# Patient Record
Sex: FEMALE | Race: BLACK OR AFRICAN AMERICAN | NOT HISPANIC OR LATINO | URBAN - METROPOLITAN AREA
[De-identification: names, ages, dates, MRNs, and addresses within clinical notes are randomized per-mention and may not be internally consistent; named-entity substitution may affect disease eponyms.]

---

## 2017-11-02 ENCOUNTER — EMERGENCY (EMERGENCY)
Facility: HOSPITAL | Age: 57
LOS: 1 days | Discharge: HOME | End: 2017-11-02
Attending: EMERGENCY MEDICINE | Admitting: EMERGENCY MEDICINE
Payer: MEDICAID

## 2017-11-02 VITALS — TEMPERATURE: 98 F | HEART RATE: 71 BPM | OXYGEN SATURATION: 100 % | RESPIRATION RATE: 16 BRPM

## 2017-11-02 PROCEDURE — 99283 EMERGENCY DEPT VISIT LOW MDM: CPT | Mod: 25

## 2017-11-02 PROCEDURE — 12001 RPR S/N/AX/GEN/TRNK 2.5CM/<: CPT | Mod: F9

## 2017-11-02 NOTE — ED PROCEDURE NOTE - CPROC ED POST PROC CARE GUIDE1
Verbal/written post procedure instructions were given to patient/caregiver./Return to ED in 2 days. Patient understands/Instructed patient/caregiver to follow-up with primary care physician.

## 2017-11-02 NOTE — ED PROVIDER NOTE - PHYSICAL EXAMINATION
Gen: No acute distress, alert, cooperative  MSK: Normal ROM of hand and fingers. Can flex distal phalange all fingers. pulses present, wound still bleeding without pressure.   Neuro: sensation intact fingers  Skin: Laceration to rt 5th digit. A chunk of skin is almost unattached at tip of 5th rt digit. bleeding from wound when pressure released, no tendons or nerves seen with exploration of wound. No foreign bodies.  Psych: normal affect, follows commands Gen: No acute distress, alert, cooperative  MSK: Normal ROM of hand and fingers. Can flex distal phalange all fingers. pulses present, wound still bleeding without pressure.   Neuro: sensation intact fingers  Skin: Laceration to rt 5th digit. <1cm square segment of skin is almost unattached at tip of 5th rt digit but is pliable and viable. bleeding from wound when pressure released, no bone seen with exploration of wound. No foreign bodies.  Psych: normal affect, follows commands

## 2017-11-02 NOTE — ED PROVIDER NOTE - PROGRESS NOTE DETAILS
Fransisco - See proc note for details. Pt to return in 2 days for wound check. Patient understands and agreable with plan. Discharging.

## 2017-11-02 NOTE — ED PROCEDURE NOTE - PROCEDURE ADDITIONAL DETAILS
Skin flap on 5th digit completely off. Discussed options with pt. Patient prefers option of suturing flap back on with understanding that it might not take and end up falling off. Needs f/u. Ring block done, completely numb. 5 sutures placed along with bulky dressing and basic splint.

## 2017-11-02 NOTE — ED PROVIDER NOTE - ATTENDING CONTRIBUTION TO CARE
I, Jennifer Cabot, MD, have performed a history and physical exam of the patient and discussed their management with the resident. I reviewed the resident's note and agree with the documented findings and plan of care. My medical decision making and observations are found above.    Cabot: 57F with PMH of DM2 s/p laceration of 5th digit with kitchen knife today.  Tetanus UTD.  On exam, tip with complete avulsion, no bone showing, no sign of infection or FB.  Irrigated copiously and tip sutured.  Will have patient return in 2 days for wound check. I, Jennifer Cabot, MD, have performed a history and physical exam of the patient and discussed their management with the resident. I reviewed the resident's note and agree with the documented findings and plan of care. My medical decision making and observations are found above.    Cabot: 57F with PMH of DM2 s/p laceration of 5th digit with kitchen knife today.  Skin flap irrigated copiously and reapplied by patient at site within minutes.  Tetanus UTD.  On exam, <1cm area of tip (1.5mm thick) with complete avulsion, no bone exposed, skin flap pliable and viable; no sign of infection or FB.  Irrigated copiously and tip sutured.  Bulky dressing and splint applied.  Will have patient return in 2 days for wound check.

## 2017-11-02 NOTE — ED PROVIDER NOTE - OBJECTIVE STATEMENT
56yo female with hx of DM2 not on insulin presenting after a laceration to right 5th digit with knife while cooking. Seen by PMD who bandaged and sent to ER for stitches. 58yo female with hx of DM2 not on insulin presenting after a laceration to right 5th digit with knife while cooking. Seen by PMD who bandaged and sent to ER for stitches. Tetanus up-to-date.

## 2017-11-02 NOTE — ED PROVIDER NOTE - MEDICAL DECISION MAKING DETAILS
58yo female with hx of DM2 not on insulin presenting after a laceration to right 5th digit with knife while cooking. Seen by PMD who bandaged and sent to ER for stitches. Likely stitches and discharge. 56yo female with hx of DM2 not on insulin presenting after a laceration to right 5th digit with knife while cooking. Seen by PMD who bandaged and sent to ER for stitches. Likely stitches and discharge. No tetanus needed. 58yo female with hx of DM2 not on insulin presenting after a laceration to right 5th digit with knife while cooking. Seen by PMD who bandaged and sent to ER for stitches. Likely stitches and discharge. No tetanus needed.    Cabot: 57F with PMH of DM2 s/p laceration of 5th digit with kitchen knife today.  Tetanus UTD.  On exam, tip with complete avulsion, no bone showing, no sign of infection or FB.  Irrigated copiously and tip sutured.  Will have patient return in 2 days for wound check. 58yo female with hx of DM2 not on insulin presenting after a laceration to right 5th digit with knife while cooking. Seen by PMD who bandaged and sent to ER for stitches. Likely stitches and discharge. Tetanus is UTD.    Cabot: 57F with PMH of DM2 s/p laceration of 5th digit with kitchen knife today.  Tetanus UTD.  On exam, <1cm area of tip (1.5mm thick) with complete avulsion, no bone showing, skin flap pliable and viable; no sign of infection or FB.  Irrigated copiously and tip sutured.  Bulky dressing and splint applied.  Will have patient return in 2 days for wound check.  Tetanus is UTD.

## 2017-11-02 NOTE — ED ADULT TRIAGE NOTE - CHIEF COMPLAINT QUOTE
Sent in by PMD for suture of laceration of right 5th digit which occurred while cutting up food. PMD dressed wound.

## 2017-11-04 ENCOUNTER — EMERGENCY (EMERGENCY)
Facility: HOSPITAL | Age: 57
LOS: 1 days | Discharge: HOME | End: 2017-11-04
Attending: EMERGENCY MEDICINE | Admitting: EMERGENCY MEDICINE
Payer: MEDICAID

## 2017-11-04 VITALS
HEART RATE: 69 BPM | OXYGEN SATURATION: 100 % | SYSTOLIC BLOOD PRESSURE: 136 MMHG | RESPIRATION RATE: 18 BRPM | TEMPERATURE: 98 F | DIASTOLIC BLOOD PRESSURE: 55 MMHG

## 2017-11-04 PROCEDURE — 99282 EMERGENCY DEPT VISIT SF MDM: CPT

## 2017-11-04 NOTE — ED PROVIDER NOTE - MEDICAL DECISION MAKING DETAILS
Plan is to redress wound. No splint needed. Soft dressing applied, and told to follow up in 10 days for suture removal.

## 2017-11-04 NOTE — ED PROVIDER NOTE - SKIN, MLM
R finger gauze 2 lateral splints in finger. Looked clean, no contamination, no wetness. Opened up gauze, wound looked well. Well healing good ROM.

## 2017-11-04 NOTE — ED PROVIDER NOTE - OBJECTIVE STATEMENT
56 y/o F with no PMHx , presents to the ED for wound check for laceration sustained 2 days prior.  She notes a laceration to the R middle finger, which was repaired and sutured in the ED. Splints were applied. No change in color to the wound. Tetanus is UTD. She denies any redness, discharge, pain, and no other complaints at present moment. Pt has no daily medications, or allergies.

## 2017-11-13 ENCOUNTER — EMERGENCY (EMERGENCY)
Facility: HOSPITAL | Age: 57
LOS: 1 days | Discharge: HOME | End: 2017-11-13
Attending: EMERGENCY MEDICINE | Admitting: EMERGENCY MEDICINE

## 2017-11-13 VITALS
DIASTOLIC BLOOD PRESSURE: 55 MMHG | TEMPERATURE: 98 F | HEART RATE: 84 BPM | OXYGEN SATURATION: 100 % | RESPIRATION RATE: 16 BRPM | SYSTOLIC BLOOD PRESSURE: 143 MMHG

## 2017-11-13 NOTE — ED PROVIDER NOTE - MEDICAL DECISION MAKING DETAILS
Attending Note (Landon): patient here for suture removal. is 11 days after injury. partial dehisence with likely non viable flap. d/w patient removing sutures now and flap now, but refused, stating she thinks it is healing.  will give more time. no signs of infection.  hand referral.

## 2017-11-13 NOTE — ED PROVIDER NOTE - MUSCULOSKELETAL MINIMAL EXAM
right 5th digit tip with sutures in place, partial wound dehisence, flap partially attached, with ecchymosis, no discharge.

## 2021-11-20 ENCOUNTER — EMERGENCY (EMERGENCY)
Facility: HOSPITAL | Age: 61
LOS: 1 days | Discharge: HOME | End: 2021-11-20
Attending: EMERGENCY MEDICINE | Admitting: EMERGENCY MEDICINE
Payer: COMMERCIAL

## 2021-11-20 VITALS
OXYGEN SATURATION: 100 % | RESPIRATION RATE: 15 BRPM | SYSTOLIC BLOOD PRESSURE: 131 MMHG | DIASTOLIC BLOOD PRESSURE: 64 MMHG | HEART RATE: 65 BPM | TEMPERATURE: 98 F

## 2021-11-20 PROBLEM — E11.9 TYPE 2 DIABETES MELLITUS WITHOUT COMPLICATIONS: Chronic | Status: ACTIVE | Noted: 2017-11-13

## 2021-11-20 PROCEDURE — 99282 EMERGENCY DEPT VISIT SF MDM: CPT

## 2021-11-20 NOTE — ED PROVIDER NOTE - NSFOLLOWUPINSTRUCTIONS_ED_ALL_ED_FT
Corneal Abrasion    The cornea is the clear covering at the front and center of the eye. This very thin tissue is made up of many layers. If a scratch or injury causes the corneal epithelium to come off, it is called a corneal abrasion. Symptoms include eye pain, redness, tearing, difficulty keeping eye open, and light sensitivity. Do not drive or operate machinery if your eye is patched.  Antibiotic eye drops may be prescribed to reduce the risk of infection.  It is important to follow up with an ophthalmologist (eye doctor) to ensure proper healing.    SEEK IMMEDIATE MEDICAL CARE IF YOU HAVE ANY OF THE FOLLOWING SYMPTOMS: discharge from eyes, changes in vision, fever, or swelling. Corneal Abrasion    The cornea is the clear covering at the front and center of the eye. This very thin tissue is made up of many layers. If a scratch or injury causes the corneal epithelium to come off, it is called a corneal abrasion. Symptoms include eye pain, redness, tearing, difficulty keeping eye open, and light sensitivity. Do not drive or operate machinery if your eye is patched.  Antibiotic eye drops may be prescribed to reduce the risk of infection.  It is important to follow up with an ophthalmologist (eye doctor) to ensure proper healing.    SEEK IMMEDIATE MEDICAL CARE IF YOU HAVE ANY OF THE FOLLOWING SYMPTOMS: discharge from eyes, changes in vision, fever, or swelling.    If pain persists follow up with an eye doctor    Apply provided antibiotics ointment to affected eye 3 times per day

## 2021-11-20 NOTE — ED PROVIDER NOTE - PATIENT PORTAL LINK FT
You can access the FollowMyHealth Patient Portal offered by [facility] by registering at the following website: [email or web address]. By joining [facility]’s FollowMyHealth portal, you will also be able to view your health information using other applications (apps) compatible with our system.

## 2021-11-20 NOTE — ED PROVIDER NOTE - OBJECTIVE STATEMENT
61 year old female presents with 8 days of right eye redness. now with 2 days of mild right sided headache. no visual changes or disturbances. no direct trauma. does not wear contacts. no neck pain or pain with movement

## 2021-11-20 NOTE — ED ADULT TRIAGE NOTE - CHIEF COMPLAINT QUOTE
Pt c/o broken blood vessel in R eye with pain since Wednesday, worsening with nagging headache.  Relieved with tylenol.  Denies any vision changes.  Denies any use of blood thinners.  No neuro deficits noted. PMHx:  DM2, LBBB

## 2021-11-27 ENCOUNTER — EMERGENCY (EMERGENCY)
Facility: HOSPITAL | Age: 61
LOS: 1 days | Discharge: HOME | End: 2021-11-27
Attending: STUDENT IN AN ORGANIZED HEALTH CARE EDUCATION/TRAINING PROGRAM | Admitting: EMERGENCY MEDICINE
Payer: COMMERCIAL

## 2021-11-27 VITALS
HEART RATE: 96 BPM | DIASTOLIC BLOOD PRESSURE: 71 MMHG | TEMPERATURE: 99 F | WEIGHT: 132.28 LBS | RESPIRATION RATE: 18 BRPM | OXYGEN SATURATION: 97 % | SYSTOLIC BLOOD PRESSURE: 152 MMHG

## 2021-11-27 LAB
ALBUMIN SERPL ELPH-MCNC: 4 G/DL — SIGNIFICANT CHANGE UP (ref 3.3–5)
ALP SERPL-CCNC: 105 U/L — SIGNIFICANT CHANGE UP (ref 40–120)
ALT FLD-CCNC: 26 U/L — SIGNIFICANT CHANGE UP (ref 4–33)
ANION GAP SERPL CALC-SCNC: 7 MMOL/L — SIGNIFICANT CHANGE UP (ref 7–14)
APTT BLD: 32.5 SEC — SIGNIFICANT CHANGE UP (ref 27–36.3)
AST SERPL-CCNC: 17 U/L — SIGNIFICANT CHANGE UP (ref 4–32)
BASOPHILS # BLD AUTO: 0.05 K/UL — SIGNIFICANT CHANGE UP (ref 0–0.2)
BASOPHILS NFR BLD AUTO: 0.6 % — SIGNIFICANT CHANGE UP (ref 0–2)
BILIRUB SERPL-MCNC: 0.3 MG/DL — SIGNIFICANT CHANGE UP (ref 0.2–1.2)
BUN SERPL-MCNC: 15 MG/DL — SIGNIFICANT CHANGE UP (ref 7–23)
CALCIUM SERPL-MCNC: 9.3 MG/DL — SIGNIFICANT CHANGE UP (ref 8.4–10.5)
CHLORIDE SERPL-SCNC: 104 MMOL/L — SIGNIFICANT CHANGE UP (ref 98–107)
CO2 SERPL-SCNC: 25 MMOL/L — SIGNIFICANT CHANGE UP (ref 22–31)
CREAT SERPL-MCNC: 0.56 MG/DL — SIGNIFICANT CHANGE UP (ref 0.5–1.3)
EOSINOPHIL # BLD AUTO: 0.2 K/UL — SIGNIFICANT CHANGE UP (ref 0–0.5)
EOSINOPHIL NFR BLD AUTO: 2.5 % — SIGNIFICANT CHANGE UP (ref 0–6)
GLUCOSE SERPL-MCNC: 114 MG/DL — HIGH (ref 70–99)
HCT VFR BLD CALC: 36.4 % — SIGNIFICANT CHANGE UP (ref 34.5–45)
HGB BLD-MCNC: 11.3 G/DL — LOW (ref 11.5–15.5)
IANC: 4.59 K/UL — SIGNIFICANT CHANGE UP (ref 1.5–8.5)
IMM GRANULOCYTES NFR BLD AUTO: 0.3 % — SIGNIFICANT CHANGE UP (ref 0–1.5)
INR BLD: 1.05 RATIO — SIGNIFICANT CHANGE UP (ref 0.88–1.16)
LYMPHOCYTES # BLD AUTO: 2.27 K/UL — SIGNIFICANT CHANGE UP (ref 1–3.3)
LYMPHOCYTES # BLD AUTO: 28.7 % — SIGNIFICANT CHANGE UP (ref 13–44)
MCHC RBC-ENTMCNC: 28.9 PG — SIGNIFICANT CHANGE UP (ref 27–34)
MCHC RBC-ENTMCNC: 31 GM/DL — LOW (ref 32–36)
MCV RBC AUTO: 93.1 FL — SIGNIFICANT CHANGE UP (ref 80–100)
MONOCYTES # BLD AUTO: 0.77 K/UL — SIGNIFICANT CHANGE UP (ref 0–0.9)
MONOCYTES NFR BLD AUTO: 9.7 % — SIGNIFICANT CHANGE UP (ref 2–14)
NEUTROPHILS # BLD AUTO: 4.59 K/UL — SIGNIFICANT CHANGE UP (ref 1.8–7.4)
NEUTROPHILS NFR BLD AUTO: 58.2 % — SIGNIFICANT CHANGE UP (ref 43–77)
NRBC # BLD: 0 /100 WBCS — SIGNIFICANT CHANGE UP
NRBC # FLD: 0 K/UL — SIGNIFICANT CHANGE UP
PLATELET # BLD AUTO: 294 K/UL — SIGNIFICANT CHANGE UP (ref 150–400)
POTASSIUM SERPL-MCNC: 4.1 MMOL/L — SIGNIFICANT CHANGE UP (ref 3.5–5.3)
POTASSIUM SERPL-SCNC: 4.1 MMOL/L — SIGNIFICANT CHANGE UP (ref 3.5–5.3)
PROT SERPL-MCNC: 7.1 G/DL — SIGNIFICANT CHANGE UP (ref 6–8.3)
PROTHROM AB SERPL-ACNC: 12 SEC — SIGNIFICANT CHANGE UP (ref 10.6–13.6)
RBC # BLD: 3.91 M/UL — SIGNIFICANT CHANGE UP (ref 3.8–5.2)
RBC # FLD: 13 % — SIGNIFICANT CHANGE UP (ref 10.3–14.5)
SODIUM SERPL-SCNC: 136 MMOL/L — SIGNIFICANT CHANGE UP (ref 135–145)
WBC # BLD: 7.9 K/UL — SIGNIFICANT CHANGE UP (ref 3.8–10.5)
WBC # FLD AUTO: 7.9 K/UL — SIGNIFICANT CHANGE UP (ref 3.8–10.5)

## 2021-11-27 PROCEDURE — 93971 EXTREMITY STUDY: CPT | Mod: 26,LT

## 2021-11-27 PROCEDURE — 99218: CPT

## 2021-11-27 PROCEDURE — 73564 X-RAY EXAM KNEE 4 OR MORE: CPT | Mod: 26,RT

## 2021-11-27 RX ORDER — KETOROLAC TROMETHAMINE 30 MG/ML
15 SYRINGE (ML) INJECTION ONCE
Refills: 0 | Status: DISCONTINUED | OUTPATIENT
Start: 2021-11-27 | End: 2021-11-27

## 2021-11-27 RX ORDER — ENOXAPARIN SODIUM 100 MG/ML
60 INJECTION SUBCUTANEOUS ONCE
Refills: 0 | Status: COMPLETED | OUTPATIENT
Start: 2021-11-27 | End: 2021-11-27

## 2021-11-27 RX ADMIN — ENOXAPARIN SODIUM 60 MILLIGRAM(S): 100 INJECTION SUBCUTANEOUS at 23:37

## 2021-11-27 RX ADMIN — Medication 15 MILLIGRAM(S): at 20:40

## 2021-11-27 NOTE — ED PROVIDER NOTE - OBJECTIVE STATEMENT
60 yo female with PMH T2DM, LBBB presents to ED for evaluation of R knee pain and leg pain. Pt reports she struck her knee 1 month ago, struck it again this past week and has been having pain since. Reports also having pain in her calf where she didn't strike her leg. Reports pain was mildly improved with TYlenol but worsened with heating pad and warm soaks. Denies sensorimotor deficits  Psx: 2 cesction, hemorrhoidectomy, hysterectomy  Allergies: augmentin  Has been vaccinated for covid. Denies recent travel or sick contacts.   Denies smoking, EtOH use, or illicit drug use

## 2021-11-27 NOTE — ED CDU PROVIDER INITIAL DAY NOTE - DETAILS
62 y/o female c/o RLE pain found to have new DVT  -placed in CDU for Lovenox and Case Management AM to set up outpt DOAC (eliquis/xarelto etc)  -pain control  -observation

## 2021-11-27 NOTE — ED CDU PROVIDER INITIAL DAY NOTE - MEDICAL DECISION MAKING DETAILS
60 y/o female c/o RLE pain found to have new DVT  -placed in CDU for Lovenox and Case Management AM to set up outpt DOAC (eliquis/xarelto etc)  -pain control  -observation

## 2021-11-27 NOTE — ED CDU PROVIDER INITIAL DAY NOTE - NS_EDPROVIDERDISPOUSERTYPE_ED_A_ED
I just spoke with the patient. She would rather come to  to see Dr. Taran Torres. She is currently at an appointment and will walk over to our office to schedule a NP visit with Dr. Taran Torres for next Wednesday 6/2 @ 1045am.     Dr. Janna Martin has already spoken to Dr Taran Torres about the CT scan . Attending Attestation (For Attendings USE Only)...

## 2021-11-27 NOTE — ED PROVIDER NOTE - PHYSICAL EXAMINATION
Gen: no acute distress, well appearing, awake, alert and oriented x 3  Cardiac: regular rate and rhythm, +S1S2  Pulm: Clear to auscultation bilaterally  Abd: soft, nontender, nondistended, no guarding  Back: neg CVA ttp, nontender spine  Extremity: RLE: +ttp knee with edema, no ecchymosis, no skin breaks, FROM hip/ankle joint, 2+ DP pulses, sensorimotor intact, +Adalberto's  Neuro: awake, alert, oriented x 3, sensorimotor intact

## 2021-11-27 NOTE — ED PROVIDER NOTE - CHIEF COMPLAINT
[facility] Emergency Department    [address]    [facility] [address]    Phone:  [phone]    Fax:  [phone]                                       Bernarda Garrett   MRN: [id number]    Department:  [facility] Emergency Department   Date of Visit:  1/10/2018           After Visit Summary Signature Page     I have received my discharge instructions, and my questions have been answered. I have discussed any challenges I see with this plan with the nurse or doctor.    ..........................................................................................................................................  Patient/Patient Representative Signature      ..........................................................................................................................................  Patient Representative Print Name and Relationship to Patient    ..................................................               ................................................  Date                                            Time    ..........................................................................................................................................  Reviewed by Signature/Title    ...................................................              ..............................................  Date                                                            Time           The patient is a 61y Female complaining of knee pain/injury.

## 2021-11-27 NOTE — ED CDU PROVIDER INITIAL DAY NOTE - OBJECTIVE STATEMENT
60 y/o female hx DM presenting to ER c/o right knee pain x 2 days. Pt. states one month ago hit her right knee into table and has luis havign mild pain since. STtaes last week hit knee again and developed worsening pain but states 2 days swelling and pain became much worse radiating to calf and having difficulty bearing weight. In ER had xrays negative for fracture and US duples showing +DVT and bakers cyst. Placed in CDU for lovenox and to have case management assist in setting up outpt DOAC treament (xarelto/eliquis etc). Pt currently resting comfortably.

## 2021-11-27 NOTE — ED CDU PROVIDER INITIAL DAY NOTE - ATTENDING CONTRIBUTION TO CARE
62 yo female with PMH DM presents to ED for evaluation of right knee pain x 2 days, actuely worsened s/p injury last week. Now with persistent pain. PE: +homans, +ttp anterior right knee, 2+DP pulses BL, sensorimotor intact.   Plan:   -Lovenox  -DOAC outpt coordination with SW

## 2021-11-28 VITALS
OXYGEN SATURATION: 100 % | RESPIRATION RATE: 16 BRPM | HEART RATE: 67 BPM | SYSTOLIC BLOOD PRESSURE: 143 MMHG | DIASTOLIC BLOOD PRESSURE: 56 MMHG | TEMPERATURE: 98 F

## 2021-11-28 PROCEDURE — 99217: CPT

## 2021-11-28 RX ORDER — RIVAROXABAN 15 MG-20MG
1 KIT ORAL
Qty: 1 | Refills: 0
Start: 2021-11-28

## 2021-11-28 RX ORDER — RIVAROXABAN 15 MG-20MG
15 KIT ORAL
Refills: 0 | Status: DISCONTINUED | OUTPATIENT
Start: 2021-11-28 | End: 2021-11-28

## 2021-11-28 RX ORDER — ACETAMINOPHEN 500 MG
975 TABLET ORAL ONCE
Refills: 0 | Status: COMPLETED | OUTPATIENT
Start: 2021-11-28 | End: 2021-11-28

## 2021-11-28 RX ORDER — RIVAROXABAN 15 MG-20MG
15 KIT ORAL ONCE
Refills: 0 | Status: COMPLETED | OUTPATIENT
Start: 2021-11-28 | End: 2021-11-28

## 2021-11-28 RX ORDER — ACETAMINOPHEN 500 MG
650 TABLET ORAL ONCE
Refills: 0 | Status: COMPLETED | OUTPATIENT
Start: 2021-11-28 | End: 2021-11-28

## 2021-11-28 RX ADMIN — Medication 650 MILLIGRAM(S): at 12:31

## 2021-11-28 RX ADMIN — Medication 975 MILLIGRAM(S): at 03:31

## 2021-11-28 RX ADMIN — Medication 975 MILLIGRAM(S): at 04:30

## 2021-11-28 RX ADMIN — RIVAROXABAN 15 MILLIGRAM(S): KIT at 14:34

## 2021-11-28 NOTE — ED CDU PROVIDER DISPOSITION NOTE - NSFOLLOWUPINSTRUCTIONS_ED_ALL_ED_FT
Follow up with your primary care doctor within 1 week, discuss blood thinner for DVT (blood clot in your right leg)  Follow up with an orthopedic doctor within 1 week for Bakers Cyst and knee pain, referral list provided    Take Xarelto 15mg twice a day for 21 days, THEN take Xarelto 20mg once daily  Take Tylenol 650mg every 6 hours as needed for pain    Return to the ER with any worsening or concerning symptoms, increased pain, swelling, weakness, numbness or any other concerns. Follow up with your primary care doctor within 1 week, discuss blood thinner for DVT (blood clot in your right leg)  Follow up with an orthopedic doctor within 1 week for Bakers Cyst and knee pain, referral list provided    Take Xarelto 15mg twice a day for 21 days, THEN take Xarelto 20mg once daily  Take Tylenol 650mg every 6 hours as needed for pain    Return to the ER with any worsening or concerning symptoms, increased pain, swelling, weakness, numbness or any other concerns.    Use crutches for assistance with walking

## 2021-11-28 NOTE — ED CDU PROVIDER SUBSEQUENT DAY NOTE - PROGRESS NOTE DETAILS
CDU - PA Karsten Damian Pt. currently resting comfortably, no complaints at this time. Will continue current plan and observation. Will discuss plan with AM team. Spoke with patients pharmacist at [facility], completed online registration for [employer] Card with patient, pharmacist confirms there will be a $10 co-pay for Xarelto starter pack. Pt provided crutches for assistance with ambulation. Will d/c home with PMD/ortho follow up. Pt will return with any worsening or concerning symptoms.

## 2021-11-28 NOTE — ED CDU PROVIDER DISPOSITION NOTE - PATIENT PORTAL LINK FT
You can access the FollowMyHealth Patient Portal offered by [facility] by registering at the following website: [email or web address]. By joining [facility]’s FollowMyHealth portal, you will also be able to view your health information using other applications (apps) compatible with our system.

## 2021-11-28 NOTE — ED CDU PROVIDER DISPOSITION NOTE - CLINICAL COURSE
62 y/o female hx DM presenting to ER c/o right knee pain x 2 days. Pt. states one month ago hit her right knee into table and has luis having mild pain since. States last week hit knee again and developed worsening pain but states 2 days swelling and pain became much worse radiating to calf and having difficulty bearing weight. In ER had xrays negative for fracture and US duplex showing +DVT and bakers cyst. Placed in CDU for lovenox and to have case management assist in setting up outpt DOAC treament (xarelto/eliquis etc). Rx sent to pts pharmacy for Xarelto, pt signed up for [employer] savings program, pharmacist at [facility] confirmed it is a $10 co-pay. Pt provided crutches for assistance with ambulation. She will follow up with her PMD and orthopedics and will return with any worsening or concerning symptoms.

## 2021-11-28 NOTE — ED ADULT NURSE REASSESSMENT NOTE - NS ED NURSE REASSESS COMMENT FT1
Pt A&Ox4, respirations even and unlabored, speaking in full sentences without any difficulty, no accessory muscle use. Pt denies any chest pain, dyspnea, dizziness, blurry vision, nausea, vomiting or discomfort. pt well appearing and in no acute distress. Pt moved to CDU via wheelchair and placed on bed with side rails up. Bed in lowest position, CDU RN aware pt was brought over.

## 2021-11-28 NOTE — ED CDU PROVIDER SUBSEQUENT DAY NOTE - ATTENDING CONTRIBUTION TO CARE
Attending/Ahsan: 62 yo F w/ h/o DM p/.w with RLE swelling and pain. In the ED work up revealed a RLE DVT and bakers cyst. Pt started on Lovenox and referred to CDU for anticoagulation and monitoring. Attending/Ahsan: 62 yo F w/ h/o DM p/.w with RLE swelling and pain. In the ED work up revealed a RLE DVT and bakers cyst. Pt started on Lovenox and referred to CDU for anticoagulation and monitoring. This morning pt reports improved RLE pain, denies systemic symptoms including chest pain or SOB.  PE: Well-appearing, NAD; PERRL/EOMI, non-icterus, supple, no DORCAS, no JVD, RRR, CTAB; Abd-soft, NT/ND, no HSM; no LE edema, mild RLE PT, no swelling or overlying skin changes; A&Ox3, nonfocal

## 2022-01-26 ENCOUNTER — OUTPATIENT (OUTPATIENT)
Dept: OUTPATIENT SERVICES | Facility: HOSPITAL | Age: 62
LOS: 1 days | Discharge: HOME | End: 2022-01-26

## 2022-01-26 DIAGNOSIS — I82.403 ACUTE EMBOLISM AND THROMBOSIS OF UNSPECIFIED DEEP VEINS OF LOWER EXTREMITY, BILATERAL: ICD-10-CM

## 2022-01-27 ENCOUNTER — RESULT REVIEW (OUTPATIENT)
Age: 62
End: 2022-01-27

## 2022-01-27 ENCOUNTER — APPOINTMENT (OUTPATIENT)
Dept: HEMATOLOGY ONCOLOGY | Facility: CLINIC | Age: 62
End: 2022-01-27
Payer: COMMERCIAL

## 2022-01-27 ENCOUNTER — NON-APPOINTMENT (OUTPATIENT)
Age: 62
End: 2022-01-27

## 2022-01-27 VITALS
BODY MASS INDEX: 23.83 KG/M2 | HEIGHT: 62.99 IN | WEIGHT: 134.48 LBS | TEMPERATURE: 97.2 F | HEART RATE: 80 BPM | RESPIRATION RATE: 16 BRPM | DIASTOLIC BLOOD PRESSURE: 76 MMHG | SYSTOLIC BLOOD PRESSURE: 152 MMHG | OXYGEN SATURATION: 100 %

## 2022-01-27 DIAGNOSIS — E11.65 TYPE 2 DIABETES MELLITUS WITH HYPERGLYCEMIA: ICD-10-CM

## 2022-01-27 DIAGNOSIS — Z80.0 FAMILY HISTORY OF MALIGNANT NEOPLASM OF DIGESTIVE ORGANS: ICD-10-CM

## 2022-01-27 DIAGNOSIS — E11.8 TYPE 2 DIABETES MELLITUS WITH UNSPECIFIED COMPLICATIONS: ICD-10-CM

## 2022-01-27 DIAGNOSIS — Z80.8 FAMILY HISTORY OF MALIGNANT NEOPLASM OF OTHER ORGANS OR SYSTEMS: ICD-10-CM

## 2022-01-27 LAB
BASOPHILS # BLD AUTO: 0.06 K/UL — SIGNIFICANT CHANGE UP (ref 0–0.2)
BASOPHILS NFR BLD AUTO: 1 % — SIGNIFICANT CHANGE UP (ref 0–2)
EOSINOPHIL # BLD AUTO: 0.39 K/UL — SIGNIFICANT CHANGE UP (ref 0–0.5)
EOSINOPHIL NFR BLD AUTO: 6.6 % — HIGH (ref 0–6)
HCT VFR BLD CALC: 39.1 % — SIGNIFICANT CHANGE UP (ref 34.5–45)
HGB BLD-MCNC: 12.7 G/DL — SIGNIFICANT CHANGE UP (ref 11.5–15.5)
IMM GRANULOCYTES NFR BLD AUTO: 0.2 % — SIGNIFICANT CHANGE UP (ref 0–1.5)
LYMPHOCYTES # BLD AUTO: 1.81 K/UL — SIGNIFICANT CHANGE UP (ref 1–3.3)
LYMPHOCYTES # BLD AUTO: 30.8 % — SIGNIFICANT CHANGE UP (ref 13–44)
MCHC RBC-ENTMCNC: 29.8 PG — SIGNIFICANT CHANGE UP (ref 27–34)
MCHC RBC-ENTMCNC: 32.5 G/DL — SIGNIFICANT CHANGE UP (ref 32–36)
MCV RBC AUTO: 91.8 FL — SIGNIFICANT CHANGE UP (ref 80–100)
MONOCYTES # BLD AUTO: 0.44 K/UL — SIGNIFICANT CHANGE UP (ref 0–0.9)
MONOCYTES NFR BLD AUTO: 7.5 % — SIGNIFICANT CHANGE UP (ref 2–14)
NEUTROPHILS # BLD AUTO: 3.17 K/UL — SIGNIFICANT CHANGE UP (ref 1.8–7.4)
NEUTROPHILS NFR BLD AUTO: 53.9 % — SIGNIFICANT CHANGE UP (ref 43–77)
NRBC # BLD: 0 /100 WBCS — SIGNIFICANT CHANGE UP (ref 0–0)
PLATELET # BLD AUTO: 262 K/UL — SIGNIFICANT CHANGE UP (ref 150–400)
RBC # BLD: 4.26 M/UL — SIGNIFICANT CHANGE UP (ref 3.8–5.2)
RBC # FLD: 12.7 % — SIGNIFICANT CHANGE UP (ref 10.3–14.5)
WBC # BLD: 5.88 K/UL — SIGNIFICANT CHANGE UP (ref 3.8–10.5)
WBC # FLD AUTO: 5.88 K/UL — SIGNIFICANT CHANGE UP (ref 3.8–10.5)

## 2022-01-27 PROCEDURE — 99204 OFFICE O/P NEW MOD 45 MIN: CPT

## 2022-01-27 RX ORDER — RIVAROXABAN 20 MG/1
20 TABLET, FILM COATED ORAL
Refills: 0 | Status: ACTIVE | COMMUNITY

## 2022-01-27 NOTE — CONSULT LETTER
[Dear  ___] : Dear  [unfilled], [Consult Letter:] : I had the pleasure of evaluating your patient, [unfilled]. [Please see my note below.] : Please see my note below. [Consult Closing:] : Thank you very much for allowing me to participate in the care of this patient.  If you have any questions, please do not hesitate to contact me. [Sincerely,] : Sincerely, [FreeTextEntry2] : Dr Michael Brand

## 2022-01-27 NOTE — HISTORY OF PRESENT ILLNESS
[0 - No Distress] : Distress Level: 0 [de-identified] : 62 yo woman with PMhx of LBBB, T2DM, right knee pain and leg pain found to have DVT 11/27/21 ( Xarelto 20 mg daily). Referred for hypercoagulable work up. \par \par Denies history of previous blood clots. Denies family history of blood clots. \par \par Denies fevers, night sweats, weight loss. Patient reports struck her right knee one month prior to developing the blood clot. \par \par Had a mammogram in 2020, WNL. Colonoscopy in 2020, WNL.

## 2022-01-27 NOTE — ASSESSMENT
[FreeTextEntry1] : 62 yo woman with PMhx of LBBB, T2DM, right knee pain and leg pain found to have DVT 11/27/21 ( Xarelto 20 mg daily). Referred for hypercoagulable work up. \par \par Denies personal history of previous blood clots. Denies family history of blood clots. \par \par Denies fevers, night sweats, weight loss. Patient reports struck her right knee one month prior to developing the blood clot. \par \par Had a mammogram in 2020, WNL. Colonoscopy in 2020, WNL. \par \par I had a detailed discussion today with the patient regarding the natural history, epidemiology, diagnosis,  and treatment of  hypercoagulable state. I reviewed her radiological studies in detail today. I then discussed the need to do a hypercoagulable work up  to determine the length of anticoagulation. I recommended her to take Xarelto 20 mg daily for at least 6 months.  I reviewed with patient the benefits versus risks of therapy. I answered all her questions to satisfaction.\par \par Greater than 50% of the encounter time was spent on counseling and coordination of care for hypercoagulable state      and I have spent  45   minutes of face to face time with the patient.\par \par RTC 6 months. \par \par \par

## 2022-01-27 NOTE — PHYSICAL EXAM
[Normal] : affect appropriate [Restricted in physically strenuous activity but ambulatory and able to carry out work of a light or sedentary nature] : Status 1- Restricted in physically strenuous activity but ambulatory and able to carry out work of a light or sedentary nature, e.g., light house work, office work [de-identified] : tenderness inner aspect of the right knee.

## 2022-01-31 LAB
ALBUMIN SERPL ELPH-MCNC: 4.5 G/DL
ALP BLD-CCNC: 123 U/L
ALT SERPL-CCNC: 23 U/L
ANION GAP SERPL CALC-SCNC: 14 MMOL/L
APTT BLD: 47.7 SEC
AST SERPL-CCNC: 19 U/L
AT III PPP CHRO-ACNC: 162 %
BILIRUB SERPL-MCNC: 0.3 MG/DL
BUN SERPL-MCNC: 16 MG/DL
CALCIUM SERPL-MCNC: 10.2 MG/DL
CHLORIDE SERPL-SCNC: 101 MMOL/L
CO2 SERPL-SCNC: 25 MMOL/L
CREAT SERPL-MCNC: 0.65 MG/DL
GLUCOSE SERPL-MCNC: 193 MG/DL
INR PPP: 2.35 RATIO
POTASSIUM SERPL-SCNC: 4 MMOL/L
PROT C PPP CHRO-ACNC: 157 %
PROT S FREE PPP-ACNC: 134 %
PROT SERPL-MCNC: 7.4 G/DL
PT BLD: 26.9 SEC
SODIUM SERPL-SCNC: 139 MMOL/L

## 2022-02-01 LAB
CARDIOLIPIN AB SER IA-ACNC: NEGATIVE
DNA PLOIDY SPEC FC-IMP: NORMAL
PTR INTERP: NORMAL

## 2022-02-02 LAB — B2 GLYCOPROT1 AB SER QL: NEGATIVE

## 2022-03-08 ENCOUNTER — APPOINTMENT (OUTPATIENT)
Dept: ULTRASOUND IMAGING | Facility: IMAGING CENTER | Age: 62
End: 2022-03-08
Payer: COMMERCIAL

## 2022-03-08 ENCOUNTER — OUTPATIENT (OUTPATIENT)
Dept: OUTPATIENT SERVICES | Facility: HOSPITAL | Age: 62
LOS: 1 days | End: 2022-03-08
Payer: COMMERCIAL

## 2022-03-08 DIAGNOSIS — I82.401 ACUTE EMBOLISM AND THROMBOSIS OF UNSPECIFIED DEEP VEINS OF RIGHT LOWER EXTREMITY: ICD-10-CM

## 2022-03-08 PROCEDURE — 93970 EXTREMITY STUDY: CPT

## 2022-03-08 PROCEDURE — 93970 EXTREMITY STUDY: CPT | Mod: 26

## 2022-06-25 ENCOUNTER — NON-APPOINTMENT (OUTPATIENT)
Age: 62
End: 2022-06-25

## 2022-07-14 ENCOUNTER — OUTPATIENT (OUTPATIENT)
Dept: OUTPATIENT SERVICES | Facility: HOSPITAL | Age: 62
LOS: 1 days | Discharge: HOME | End: 2022-07-14

## 2022-07-14 DIAGNOSIS — I82.403 ACUTE EMBOLISM AND THROMBOSIS OF UNSPECIFIED DEEP VEINS OF LOWER EXTREMITY, BILATERAL: ICD-10-CM

## 2022-07-26 ENCOUNTER — APPOINTMENT (OUTPATIENT)
Dept: HEMATOLOGY ONCOLOGY | Facility: CLINIC | Age: 62
End: 2022-07-26

## 2022-07-26 ENCOUNTER — RESULT REVIEW (OUTPATIENT)
Age: 62
End: 2022-07-26

## 2022-07-26 ENCOUNTER — NON-APPOINTMENT (OUTPATIENT)
Age: 62
End: 2022-07-26

## 2022-07-26 VITALS
OXYGEN SATURATION: 98 % | HEIGHT: 62.99 IN | BODY MASS INDEX: 23.44 KG/M2 | HEART RATE: 72 BPM | DIASTOLIC BLOOD PRESSURE: 60 MMHG | WEIGHT: 132.28 LBS | TEMPERATURE: 97.8 F | RESPIRATION RATE: 16 BRPM | SYSTOLIC BLOOD PRESSURE: 122 MMHG

## 2022-07-26 DIAGNOSIS — I82.401 ACUTE EMBOLISM AND THROMBOSIS OF UNSPECIFIED DEEP VEINS OF RIGHT LOWER EXTREMITY: ICD-10-CM

## 2022-07-26 LAB
BASOPHILS # BLD AUTO: 0.06 K/UL — SIGNIFICANT CHANGE UP (ref 0–0.2)
BASOPHILS NFR BLD AUTO: 1 % — SIGNIFICANT CHANGE UP (ref 0–2)
EOSINOPHIL # BLD AUTO: 0.24 K/UL — SIGNIFICANT CHANGE UP (ref 0–0.5)
EOSINOPHIL NFR BLD AUTO: 3.8 % — SIGNIFICANT CHANGE UP (ref 0–6)
HCT VFR BLD CALC: 35.9 % — SIGNIFICANT CHANGE UP (ref 34.5–45)
HGB BLD-MCNC: 11.6 G/DL — SIGNIFICANT CHANGE UP (ref 11.5–15.5)
IMM GRANULOCYTES NFR BLD AUTO: 0.3 % — SIGNIFICANT CHANGE UP (ref 0–1.5)
LYMPHOCYTES # BLD AUTO: 1.97 K/UL — SIGNIFICANT CHANGE UP (ref 1–3.3)
LYMPHOCYTES # BLD AUTO: 31.5 % — SIGNIFICANT CHANGE UP (ref 13–44)
MCHC RBC-ENTMCNC: 29.2 PG — SIGNIFICANT CHANGE UP (ref 27–34)
MCHC RBC-ENTMCNC: 32.3 G/DL — SIGNIFICANT CHANGE UP (ref 32–36)
MCV RBC AUTO: 90.4 FL — SIGNIFICANT CHANGE UP (ref 80–100)
MONOCYTES # BLD AUTO: 0.51 K/UL — SIGNIFICANT CHANGE UP (ref 0–0.9)
MONOCYTES NFR BLD AUTO: 8.2 % — SIGNIFICANT CHANGE UP (ref 2–14)
NEUTROPHILS # BLD AUTO: 3.45 K/UL — SIGNIFICANT CHANGE UP (ref 1.8–7.4)
NEUTROPHILS NFR BLD AUTO: 55.2 % — SIGNIFICANT CHANGE UP (ref 43–77)
NRBC # BLD: 0 /100 WBCS — SIGNIFICANT CHANGE UP (ref 0–0)
PLATELET # BLD AUTO: 260 K/UL — SIGNIFICANT CHANGE UP (ref 150–400)
RBC # BLD: 3.97 M/UL — SIGNIFICANT CHANGE UP (ref 3.8–5.2)
RBC # FLD: 12.9 % — SIGNIFICANT CHANGE UP (ref 10.3–14.5)
WBC # BLD: 6.25 K/UL — SIGNIFICANT CHANGE UP (ref 3.8–10.5)
WBC # FLD AUTO: 6.25 K/UL — SIGNIFICANT CHANGE UP (ref 3.8–10.5)

## 2022-07-26 PROCEDURE — 99214 OFFICE O/P EST MOD 30 MIN: CPT

## 2022-07-26 RX ORDER — GLIMEPIRIDE 1 MG/1
1 TABLET ORAL
Qty: 30 | Refills: 0 | Status: ACTIVE | COMMUNITY
Start: 2022-07-20

## 2022-07-26 RX ORDER — METFORMIN HYDROCHLORIDE 500 MG/1
500 TABLET, COATED ORAL
Qty: 30 | Refills: 0 | Status: ACTIVE | COMMUNITY
Start: 2021-05-27

## 2022-07-26 RX ORDER — ROSUVASTATIN CALCIUM 10 MG/1
10 TABLET, FILM COATED ORAL
Qty: 30 | Refills: 0 | Status: ACTIVE | COMMUNITY
Start: 2022-07-20

## 2022-07-26 RX ORDER — GLIPIZIDE 5 MG/1
5 TABLET ORAL
Qty: 30 | Refills: 0 | Status: ACTIVE | COMMUNITY
Start: 2022-03-09

## 2022-07-26 NOTE — HISTORY OF PRESENT ILLNESS
[0 - No Distress] : Distress Level: 0 [80: Normal activity with effort; some signs or symptoms of disease.] : 80: Normal activity with effort; some signs or symptoms of disease.  [de-identified] : 60 yo woman with PMhx of LBBB, T2DM, right knee pain and leg pain found to have DVT 11/27/21 ( Xarelto 20 mg daily). Referred for hypercoagulable work up. \par \par Denies history of previous blood clots. Denies family history of blood clots. \par \par Denies fevers, night sweats, weight loss. Patient reports struck her right knee one month prior to developing the blood clot. \par \par Had a mammogram in 2020, WNL. Colonoscopy in 2020, WNL.  [de-identified] : 1/27/22 Hypercoagulable work up was normal. Patient is on Xarelto 20 mg daily. \par \par No new blood clots.\par \par No other changes in medical, surgical or social history since 1/27/22. \par

## 2022-07-26 NOTE — ASSESSMENT
[FreeTextEntry1] : 60 yo woman with PMhx of LBBB, T2DM, right knee pain and leg pain found to have DVT 11/27/21 ( Xarelto 20 mg daily). Referred for hypercoagulable work up. \par \par Denies personal history of previous blood clots. Denies family history of blood clots. \par \par Denies fevers, night sweats, weight loss. Patient reports struck her right knee one month prior to developing the blood clot. \par \par Had a mammogram in 2020, WNL. Colonoscopy in 2020, WNL. \par \par 1/27/22 Hypercoagulable work up was normal. Patient is on Xarelto 20 mg daily.  I recommended patient to discontinued Xarelto and start ASA 81 mg daily. \par \par RTC prn. \par \par \par

## 2024-06-07 ENCOUNTER — EMERGENCY (EMERGENCY)
Facility: HOSPITAL | Age: 64
LOS: 1 days | Discharge: HOME | End: 2024-06-07
Attending: EMERGENCY MEDICINE | Admitting: EMERGENCY MEDICINE
Payer: MEDICARE

## 2024-06-07 VITALS
RESPIRATION RATE: 16 BRPM | TEMPERATURE: 98 F | OXYGEN SATURATION: 100 % | DIASTOLIC BLOOD PRESSURE: 60 MMHG | HEART RATE: 60 BPM | SYSTOLIC BLOOD PRESSURE: 130 MMHG

## 2024-06-07 VITALS
WEIGHT: 132.94 LBS | RESPIRATION RATE: 16 BRPM | HEART RATE: 90 BPM | TEMPERATURE: 98 F | DIASTOLIC BLOOD PRESSURE: 69 MMHG | SYSTOLIC BLOOD PRESSURE: 151 MMHG | OXYGEN SATURATION: 100 %

## 2024-06-07 LAB
ALBUMIN SERPL ELPH-MCNC: 4.2 G/DL — SIGNIFICANT CHANGE UP (ref 3.3–5)
ALP SERPL-CCNC: 107 U/L — SIGNIFICANT CHANGE UP (ref 40–120)
ALT FLD-CCNC: 30 U/L — SIGNIFICANT CHANGE UP (ref 4–33)
ANION GAP SERPL CALC-SCNC: 11 MMOL/L — SIGNIFICANT CHANGE UP (ref 7–14)
AST SERPL-CCNC: 22 U/L — SIGNIFICANT CHANGE UP (ref 4–32)
BASOPHILS # BLD AUTO: 0.05 K/UL — SIGNIFICANT CHANGE UP (ref 0–0.2)
BASOPHILS NFR BLD AUTO: 0.8 % — SIGNIFICANT CHANGE UP (ref 0–2)
BILIRUB SERPL-MCNC: 0.4 MG/DL — SIGNIFICANT CHANGE UP (ref 0.2–1.2)
BUN SERPL-MCNC: 12 MG/DL — SIGNIFICANT CHANGE UP (ref 7–23)
CALCIUM SERPL-MCNC: 9.7 MG/DL — SIGNIFICANT CHANGE UP (ref 8.4–10.5)
CHLORIDE SERPL-SCNC: 105 MMOL/L — SIGNIFICANT CHANGE UP (ref 98–107)
CO2 SERPL-SCNC: 23 MMOL/L — SIGNIFICANT CHANGE UP (ref 22–31)
CREAT SERPL-MCNC: 0.56 MG/DL — SIGNIFICANT CHANGE UP (ref 0.5–1.3)
EGFR: 101 ML/MIN/1.73M2 — SIGNIFICANT CHANGE UP
EOSINOPHIL # BLD AUTO: 0.21 K/UL — SIGNIFICANT CHANGE UP (ref 0–0.5)
EOSINOPHIL NFR BLD AUTO: 3.2 % — SIGNIFICANT CHANGE UP (ref 0–6)
GLUCOSE SERPL-MCNC: 147 MG/DL — HIGH (ref 70–99)
HCT VFR BLD CALC: 37.7 % — SIGNIFICANT CHANGE UP (ref 34.5–45)
HGB BLD-MCNC: 12.3 G/DL — SIGNIFICANT CHANGE UP (ref 11.5–15.5)
IANC: 4.21 K/UL — SIGNIFICANT CHANGE UP (ref 1.8–7.4)
IMM GRANULOCYTES NFR BLD AUTO: 0.3 % — SIGNIFICANT CHANGE UP (ref 0–0.9)
LYMPHOCYTES # BLD AUTO: 1.75 K/UL — SIGNIFICANT CHANGE UP (ref 1–3.3)
LYMPHOCYTES # BLD AUTO: 26.3 % — SIGNIFICANT CHANGE UP (ref 13–44)
MCHC RBC-ENTMCNC: 29.5 PG — SIGNIFICANT CHANGE UP (ref 27–34)
MCHC RBC-ENTMCNC: 32.6 GM/DL — SIGNIFICANT CHANGE UP (ref 32–36)
MCV RBC AUTO: 90.4 FL — SIGNIFICANT CHANGE UP (ref 80–100)
MONOCYTES # BLD AUTO: 0.42 K/UL — SIGNIFICANT CHANGE UP (ref 0–0.9)
MONOCYTES NFR BLD AUTO: 6.3 % — SIGNIFICANT CHANGE UP (ref 2–14)
NEUTROPHILS # BLD AUTO: 4.21 K/UL — SIGNIFICANT CHANGE UP (ref 1.8–7.4)
NEUTROPHILS NFR BLD AUTO: 63.1 % — SIGNIFICANT CHANGE UP (ref 43–77)
NRBC # BLD: 0 /100 WBCS — SIGNIFICANT CHANGE UP (ref 0–0)
NRBC # FLD: 0 K/UL — SIGNIFICANT CHANGE UP (ref 0–0)
PLATELET # BLD AUTO: 291 K/UL — SIGNIFICANT CHANGE UP (ref 150–400)
POTASSIUM SERPL-MCNC: 4.2 MMOL/L — SIGNIFICANT CHANGE UP (ref 3.5–5.3)
POTASSIUM SERPL-SCNC: 4.2 MMOL/L — SIGNIFICANT CHANGE UP (ref 3.5–5.3)
PROT SERPL-MCNC: 7.7 G/DL — SIGNIFICANT CHANGE UP (ref 6–8.3)
RBC # BLD: 4.17 M/UL — SIGNIFICANT CHANGE UP (ref 3.8–5.2)
RBC # FLD: 13 % — SIGNIFICANT CHANGE UP (ref 10.3–14.5)
SODIUM SERPL-SCNC: 139 MMOL/L — SIGNIFICANT CHANGE UP (ref 135–145)
WBC # BLD: 6.66 K/UL — SIGNIFICANT CHANGE UP (ref 3.8–10.5)
WBC # FLD AUTO: 6.66 K/UL — SIGNIFICANT CHANGE UP (ref 3.8–10.5)

## 2024-06-07 PROCEDURE — 70450 CT HEAD/BRAIN W/O DYE: CPT | Mod: 26,MC

## 2024-06-07 PROCEDURE — 93010 ELECTROCARDIOGRAM REPORT: CPT

## 2024-06-07 PROCEDURE — 99284 EMERGENCY DEPT VISIT MOD MDM: CPT

## 2024-06-07 RX ORDER — ACETAMINOPHEN 500 MG
1000 TABLET ORAL ONCE
Refills: 0 | Status: COMPLETED | OUTPATIENT
Start: 2024-06-07 | End: 2024-06-07

## 2024-06-07 RX ORDER — METOCLOPRAMIDE HCL 10 MG
10 TABLET ORAL ONCE
Refills: 0 | Status: COMPLETED | OUTPATIENT
Start: 2024-06-07 | End: 2024-06-07

## 2024-06-07 RX ADMIN — Medication 400 MILLIGRAM(S): at 13:55

## 2024-06-07 RX ADMIN — Medication 10 MILLIGRAM(S): at 13:55

## 2024-06-07 NOTE — ED ADULT NURSE NOTE - OBJECTIVE STATEMENT
Patient A&o X4, history of DM2, left bundle branch block, DVT on aspirin, received in spot 1a, with complaints of headache/blurry vision. Patient states, "I accidentally hit myself in the bed with a broom on Sunday while trying to open a window". Patient denies LOC, admits to hitting head and use of blood thinners. Patient also complains of left eye blurry vision that started this morning at 7am, patient admits to chronic blurry vision in right eye. No hematoma noted to head at this time. Patient denies any dizziness or lightheadedness at this time. Patient able to speak in clear sentences, respirations equal and unlabored. Lung sounds clear b/l, equal chest rise and fall noted. Denies CP/SOB, fever, chills, nausea, vomiting and diarrhea at this time. Skin warm and dry. Provider at bedside for eval, pending further orders.

## 2024-06-07 NOTE — ED PROVIDER NOTE - NSFOLLOWUPINSTRUCTIONS_ED_ALL_ED_FT
You were seen in the ER today for headache.    While you were here you had CT scan and labs that were performed which were discussed with you.     Please follow-up with a concussion clinic if your symptoms persist.  You can call the number in the paperwork to set up an appointment.    Please follow up with your primary care doctor within 1 - 3 days. Call and let them know you were seen in the ER today.     Bring the results of your blood work and imaging with you to your appointment, if applicable.    For pain, please take acetaminophen 1000 mg every 8 hours for pain.   Additionally, you can also take ibuprofen 400 mg every 6 hours for pain.    Please return to the Emergency Department if you experience any new or concerning symptoms, such as, but not limited to: worsening or severe pain, large amount of bleeding, passing out, shaking chills, vision changes, chest pain, difficulty breathing, unable to eat or drink, continuous vomiting or diarrhea, or are unable to move or feel part of your body.

## 2024-06-07 NOTE — ED PROVIDER NOTE - PHYSICAL EXAMINATION
PHYSICAL EXAM:  CONSTITUTIONAL: Well appearing, awake, alert, oriented to person, place, time/situation and in no apparent distress.  HEAD: Atraumatic  EYES: Clear bilaterally, pupils equal, round and reactive to light.  CARDIAC: Normal rate, regular rhythm. +S1/S2. No murmurs, rubs or gallops.  RESPIRATORY: Breathing unlabored. Breath sounds clear and equal bilaterally.  ABDOMEN: Soft, nontender, nondistended. No rebound tenderness or guarding.  NEUROLOGICAL: Alert and oriented, no focal deficits, no motor or sensory deficits. CN2-12 intact. Sensation intact x4 extremities.  SKIN: Skin warm and dry. No evidence of rashes or lesions.

## 2024-06-07 NOTE — ED ADULT NURSE REASSESSMENT NOTE - NS ED NURSE REASSESS COMMENT FT1
Break RN: Pt is A&Ox4, resting in stretcher with no complaints at this time. Respirations even and unlabored, chest rise equal b/l. VS as noted in flow sheets. Pt denies chest pain, SOB, fever, cough, chills, abdominal pain, N/V/D, h/a, dizziness, numbness/tingling or any urinary symptoms at this time. No acute distress noted. Safety maintained throughout.

## 2024-06-07 NOTE — ED PROVIDER NOTE - PROGRESS NOTE DETAILS
Leeann Horvath DO PGY-3  Patient signed out to me pending CTH which was nonactonable. Was re-assessed. Vitals stable. Pain has improved after interventions. Test results explained to patient. Patient feels better and ready for discharge.    Discussed the plan for discharge home with the patient. Advised return precautions for any alarming or worsening symptoms, or any other concerns. Recommended that the patient call their primary care doctor today, inform them of their visit to the ER, and to obtain repeat evaluation from their PCP in the next 1-3 days. Patient expresses understanding and agrees with the plan for follow up. At the time of discharge the patient remained stable, in no acute distress.

## 2024-06-07 NOTE — ED PROVIDER NOTE - ATTENDING CONTRIBUTION TO CARE
Pt was seen and evaluated by me. Pt was seen and evaluated by me. Pt is a 64 y/o female with PMHx of HTN and previous DVT who presented to the ED for headache X 6 days. Pt states 6 days ago she was using a broomstick to open a window the broom slipped and hit her on the right side of her head. Pt denies any LOC. Pt states since having headache. Pt admitted to some blurry vision at times. Pt denies any fever, chills, nausea, vomiting, SOB, chest pain, or abd pain.  VITALS: Vitals have been reviewed.  GEN APPEARANCE: Alert and cooperative, non-toxic appearing and in NAD  HEAD: Atraumatic, normocephalic.   EYES: PERRL, EOMI.   EARS: Gross hearing intact.   NOSE: No nasal discharge.   THROAT: MMM. Oral cavity and pharynx normal.   CV: RRR, S1S2, no c/r/m/g. No cyanosis or pallor.   LUNGS: CTAB. No wheezing. No rales. No rhonchi. No diminished breath sounds.   ABDOMEN: Soft, NTND. No guarding or rebound.   MSK/EXT: Spine appears normal, no spine point tenderness.   NEURO: Alert, follows commands. Speech normal. Sensation and motor normal x4 extremities.   SKIN: Normal color for race, warm, dry and intact. No evidence of rash.  64 y/o female with PMHx of HTN and previous DVT who presented to the ED for headache X 6 days.  Concern for Concussion, ICB, Closed Head Injury  Will get CT and given analgesia

## 2024-06-07 NOTE — ED PROVIDER NOTE - OBJECTIVE STATEMENT
63-year-old female with history of hypertension DVT formerly on Xarelto now only on aspirin presenting with headache.  Patient states that 6 days ago she was using a broom stick to open a window when the broom stick slipped and hit her on the right side of her head.  Patient states that this was a very large broomstick.  Patient has had persistent right-sided headache since the incident.  States that it has persisted despite analgesia at home.  States that that she has noticed since then having blurry vision that alternates between her 2 eyes.  States that right now she is having right-sided blurry vision.  Patient denies numbness weakness issues with balance or coordination chest pain shortness of breath.

## 2024-06-07 NOTE — ED ADULT TRIAGE NOTE - CHIEF COMPLAINT QUOTE
Pt reports on Sunday she was closing her window with a broom, when broom accidentally hit her in rt side of head. C/o headache. Endorsing blurry vision in left eye that began around 7am. Reports baseline blurry vision in rt eye. Denies dizziness, chest pain. pmhx: DM2, DVT in rt leg

## 2024-06-07 NOTE — ED PROVIDER NOTE - PATIENT PORTAL LINK FT
You can access the FollowMyHealth Patient Portal offered by [facility] by registering at the following website: [email or web address]. By joining [facility]’s FollowMyHealth portal, you will also be able to view your health information using other applications (apps) compatible with our system.

## 2024-06-07 NOTE — ED PROVIDER NOTE - CLINICAL SUMMARY MEDICAL DECISION MAKING FREE TEXT BOX
3-year-old female with history of hypertension DVT formerly on Xarelto now only on aspirin presenting with headache x6d since head strike. vs not actionable.. Exam with normal neuro exam. Will cth, cbc cmp give apap and reglan and reassess. Dispo per results of imaging and pain control 63-year-old female with history of hypertension DVT formerly on Xarelto now only on aspirin presenting with headache x6d since head strike. vs not actionable.. Exam with normal neuro exam. Will cth, cbc cmp give apap and reglan and reassess. Dispo per results of imaging and pain control

## 2025-02-19 NOTE — ED ADULT TRIAGE NOTE - NS ED TRIAGE HISTORIAN
Attempted to reach pt to r/s 2/20 appointment.  Unable to reach pt, lvm.  Called pt's son, unable to reach, lvm informing tomorrow's appt will be cancelled and to call back to r/s.  SS 2/19  
Called patient on 2/17 and today 2/18 to rescheduled 2/20 appointment with Dr. Marie due to md out on family emergency.  Unable to reach pt, lvms.  Portal message will be sent.  SS 2/18  
Patient

## 2025-08-23 ENCOUNTER — EMERGENCY (EMERGENCY)
Facility: HOSPITAL | Age: 65
LOS: 1 days | End: 2025-08-23
Attending: EMERGENCY MEDICINE | Admitting: EMERGENCY MEDICINE
Payer: MEDICARE

## 2025-08-23 VITALS
OXYGEN SATURATION: 98 % | HEIGHT: 64 IN | DIASTOLIC BLOOD PRESSURE: 69 MMHG | TEMPERATURE: 98 F | HEART RATE: 74 BPM | WEIGHT: 132.06 LBS | RESPIRATION RATE: 16 BRPM | SYSTOLIC BLOOD PRESSURE: 132 MMHG

## 2025-08-23 PROBLEM — I82.409 ACUTE EMBOLISM AND THROMBOSIS OF UNSPECIFIED DEEP VEINS OF UNSPECIFIED LOWER EXTREMITY: Chronic | Status: ACTIVE | Noted: 2024-06-07

## 2025-08-23 PROCEDURE — 99284 EMERGENCY DEPT VISIT MOD MDM: CPT

## 2025-08-23 RX ORDER — DOXYCYCLINE HYCLATE 100 MG
1 TABLET ORAL
Qty: 10 | Refills: 0
Start: 2025-08-23 | End: 2025-08-27